# Patient Record
Sex: MALE | Race: WHITE
[De-identification: names, ages, dates, MRNs, and addresses within clinical notes are randomized per-mention and may not be internally consistent; named-entity substitution may affect disease eponyms.]

---

## 2018-01-05 ENCOUNTER — HOSPITAL ENCOUNTER (EMERGENCY)
Dept: HOSPITAL 76 - ED | Age: 48
Discharge: HOME | End: 2018-01-05
Payer: COMMERCIAL

## 2018-01-05 VITALS — DIASTOLIC BLOOD PRESSURE: 84 MMHG | SYSTOLIC BLOOD PRESSURE: 130 MMHG

## 2018-01-05 DIAGNOSIS — N12: Primary | ICD-10-CM

## 2018-01-05 DIAGNOSIS — Z87.440: ICD-10-CM

## 2018-01-05 LAB
CLARITY UR REFRACT.AUTO: (no result)
GLUCOSE UR QL STRIP.AUTO: NEGATIVE MG/DL
KETONES UR QL STRIP.AUTO: NEGATIVE MG/DL
NITRITE UR QL STRIP.AUTO: POSITIVE
PH UR STRIP.AUTO: 6 PH (ref 5–7.5)
PROT UR STRIP.AUTO-MCNC: NEGATIVE MG/DL
RBC # UR STRIP.AUTO: (no result) /UL
RBC # URNS HPF: (no result) /HPF (ref 0–5)
SP GR UR STRIP.AUTO: <=1.005 (ref 1–1.03)
SQUAMOUS URNS QL MICRO: (no result)
UROBILINOGEN UR QL STRIP.AUTO: (no result) E.U./DL
UROBILINOGEN UR STRIP.AUTO-MCNC: NEGATIVE MG/DL

## 2018-01-05 PROCEDURE — 81001 URINALYSIS AUTO W/SCOPE: CPT

## 2018-01-05 PROCEDURE — 99283 EMERGENCY DEPT VISIT LOW MDM: CPT

## 2018-01-05 PROCEDURE — 87181 SC STD AGAR DILUTION PER AGT: CPT

## 2018-01-05 PROCEDURE — 87086 URINE CULTURE/COLONY COUNT: CPT

## 2018-01-05 PROCEDURE — 81003 URINALYSIS AUTO W/O SCOPE: CPT

## 2018-01-05 NOTE — ED PHYSICIAN DOCUMENTATION
PD HPI MALE 





- Stated complaint


Stated Complaint: MALE 





- Chief complaint


Chief Complaint: UTI





- History obtained from


History obtained from: Patient





- History of Present Illness


Timing - onset: Other (He has a history of UTI 1 about 3 years ago.  He ran a 

marathon on [place] Year[place] and then started to feel little ill the next day, 

got worse over the next couple of days with fatigue and body aches, nothing 

focal.  It got better but then last night developed shaking chills and blood in 

the urine with frequency.  No flank pain.)





Review of Systems


Constitutional: reports: Fever, Chills, Myalgias, Fatigue


Ears: denies: Ear pain


Nose: denies: Rhinorrhea / runny nose, Congestion


Throat: denies: Sore throat


Respiratory: denies: Cough


GI: denies: Abdominal Pain, Nausea, Vomiting, Diarrhea





PD PAST MEDICAL HISTORY





- Past Medical History


Past Medical History: No





- Past Surgical History


Past Surgical History: No





- Present Medications


Home Medications: 


 Ambulatory Orders











 Medication  Instructions  Recorded  Confirmed


 


Ciprofloxacin HCl [Cipro] 500 mg PO BID #20 tablet 01/05/18 














- Allergies


Allergies/Adverse Reactions: 


 Allergies











Allergy/AdvReac Type Severity Reaction Status Date / Time


 


No Known Drug Allergies Allergy   Verified 01/05/18 09:07














- Social History


Does the pt smoke?: No


Smoking Status: Never smoker


Does the pt drink ETOH?: Yes


Does the pt have substance abuse?: No





- Immunizations


Immunizations are current?: Yes





- POLST


Patient has POLST: No





PD ED PE NORMAL





- Vitals


Vital signs reviewed: Yes





- General


General: Alert and oriented X 3, No acute distress





- HEENT


HEENT: Pharynx benign





- Cardiac


Cardiac: RRR, No murmur





- Respiratory


Respiratory: No respiratory distress, Clear bilaterally





- Abdomen


Abdomen: Non tender





- Rectal


Rectal: Other (Prostate nontender)





- Back


Back: No CVA TTP, No spinal TTP





- Extremities


Extremities: No edema, No calf tenderness / cord





- Neuro


Neuro: Alert and oriented X 3, Normal speech





Results





- Vitals


Vitals: 


 Vital Signs - 24 hr











  01/05/18





  09:08


 


Temperature 36.6 C


 


Heart Rate 70


 


Respiratory 16





Rate 


 


Blood Pressure 122/73


 


O2 Saturation 97








 Oxygen











O2 Source                      Room air

















- Labs


Labs: 


 Laboratory Tests











  01/05/18





  09:20


 


Urine Color  YELLOW


 


Urine Clarity  HAZY


 


Urine pH  6.0


 


Ur Specific Gravity  <=1.005


 


Urine Protein  NEGATIVE


 


Urine Glucose (UA)  NEGATIVE


 


Urine Ketones  NEGATIVE


 


Urine Occult Blood  MODERATE H


 


Urine Nitrite  POSITIVE H


 


Urine Bilirubin  NEGATIVE


 


Urine Urobilinogen  0.2 (NORMAL)


 


Ur Leukocyte Esterase  LARGE H


 


Urine RBC  0-5


 


Urine WBC  >25 H


 


Ur Squamous Epith Cells  RARE Squamous


 


Urine Bacteria  Many H


 


Ur Microscopic Review  INDICATED


 


Urine Culture Comments  INDICATED














Departure





- Departure


Disposition: 01 Home, Self Care


Clinical Impression: 


 Pyelonephritis





Condition: Good


Record reviewed to determine appropriate education?: Yes


Instructions:  ED UTI Pyelonephritis Male


Prescriptions: 


Ciprofloxacin HCl [Cipro] 500 mg PO BID #20 tablet


Comments: 


Call your doctor to arrange a follow-up appointment, make the next available 

appointment.  In the interim, return anytime if worse or if new symptoms 

develop.





We will culture your urine, the results should be done in 48-72 hours.  If an 

antibiotic change is necessary we will call you.  Return if worse in the 

meantime, especially if you develop increasing flank pain, fevers, or cannot 

keep down the medication.

## 2018-11-21 ENCOUNTER — HOSPITAL ENCOUNTER (EMERGENCY)
Dept: HOSPITAL 76 - ED | Age: 48
Discharge: HOME | End: 2018-11-21
Payer: COMMERCIAL

## 2018-11-21 VITALS — DIASTOLIC BLOOD PRESSURE: 67 MMHG | SYSTOLIC BLOOD PRESSURE: 137 MMHG

## 2018-11-21 DIAGNOSIS — J06.9: Primary | ICD-10-CM

## 2018-11-21 PROCEDURE — 99283 EMERGENCY DEPT VISIT LOW MDM: CPT

## 2018-11-21 NOTE — ED PHYSICIAN DOCUMENTATION
PD HPI URI





- Stated complaint


Stated Complaint: CHEST CONGESTION/COUGHING





- Chief complaint


Chief Complaint: Resp





- History obtained from


History obtained from: Patient





- History of Present Illness


Timing - onset: How many days ago (2-3)


Timing duration: Days (2-3)


Timing details: Gradual onset


Associated symptoms: Nasal congestion, Sore throat, Swollen nodes, Dry cough.  

No: Fever


Contributing factors: No: Sick contact [place] and the 

patient is concerned that his wife had recent vaginitis and patient had contact 

with her orally.)





Review of Systems


Constitutional: reports: Chills, Myalgias.  denies: Fever


Nose: reports: Rhinorrhea / runny nose, Congestion


Throat: reports: Sore throat


Respiratory: reports: Dyspnea, Cough





PD PAST MEDICAL HISTORY





- Past Medical History


Cardiovascular: None


Neuro: None


HEENT: Other (redness without exudate in back of throat. )





- Past Surgical History


Past Surgical History: No





- Present Medications


Home Medications: 


                                Ambulatory Orders











 Medication  Instructions  Recorded  Confirmed


 


Benzonatate [Tessalon Perle] 100 - 200 mg PO TID PRN #30 capsule 11/21/18 


 


Dexamethasone [Decadron] 4 mg PO DAILY #5 tablet 11/21/18 


 


Doxycycline Hyclate 100 mg PO BID #14 capsule 11/21/18 


 


guaiFENesin/CODEINE [Robitussin AC] 5 - 10 ml PO Q6H #120 ml 11/21/18 














- Allergies


Allergies/Adverse Reactions: 


                                    Allergies











Allergy/AdvReac Type Severity Reaction Status Date / Time


 


No Known Drug Allergies Allergy   Verified 11/21/18 11:39














- Social History


Does the pt smoke?: No


Smoking Status: Never smoker


Does the pt drink ETOH?: Yes


Does the pt have substance abuse?: No





- Immunizations


Immunizations are current?: Yes





- POLST


Patient has POLST: No





PD ED PE NORMAL





- Vitals


Vital signs reviewed: Yes





- General


General: Alert and oriented X 3, No acute distress, Well developed/nourished





- HEENT


HEENT: Ears normal, Moist mucous membranes, Pharynx benign, Dentition benign





- Neck


Neck: Supple, no meningeal sign, No bony TTP, No adenopathy





Results





- Vitals


Vitals: 


                               Vital Signs - 24 hr











  11/21/18





  11:37


 


Temperature 37.1 C


 


Heart Rate 63


 


Respiratory 18





Rate 


 


Blood Pressure 137/67 H


 


O2 Saturation 97








                                     Oxygen











O2 Source                      Room air

















PD MEDICAL DECISION MAKING





- ED course


Complexity details: considered differential, d/w patient





Departure





- Departure


Disposition: 01 Home, Self Care


Clinical Impression: 


Upper respiratory infection


Qualifiers:


 URI type: unspecified URI Qualified Code(s): J06.9 - Acute upper respiratory 

infection, unspecified





Condition: Stable


Record reviewed to determine appropriate education?: Yes


Instructions:  ED Upper Resp Infec Abx Tx


Follow-Up: 


LEON LEDEZMA [Primary Care Provider] - 


Prescriptions: 


Benzonatate [Tessalon Perle] 100 - 200 mg PO TID PRN #30 capsule


 PRN Reason: Cough


Dexamethasone [Decadron] 4 mg PO DAILY #5 tablet


Doxycycline Hyclate 100 mg PO BID #14 capsule


guaiFENesin/CODEINE [Robitussin AC] 5 - 10 ml PO Q6H #120 ml


Comments: 


Rest and drink lots of fluids.  Tessalon if needed for cough.  Decadron steroid 

for reducing inflammation of the bronchials.  Doxycycline antibiotic for the 

bronchial infection.  Add cough medicine if needed.  Tylenol or ibuprofen if 

needed for fevers and pains.  Recheck if not improving over the next several 

days to week.


Discharge Date/Time: 11/21/18 13:43

## 2022-03-27 ENCOUNTER — HOSPITAL ENCOUNTER (EMERGENCY)
Dept: HOSPITAL 76 - ED | Age: 52
Discharge: HOME | End: 2022-03-27
Payer: COMMERCIAL

## 2022-03-27 VITALS — DIASTOLIC BLOOD PRESSURE: 71 MMHG | SYSTOLIC BLOOD PRESSURE: 133 MMHG

## 2022-03-27 DIAGNOSIS — M54.50: Primary | ICD-10-CM

## 2022-03-27 PROCEDURE — 96372 THER/PROPH/DIAG INJ SC/IM: CPT

## 2022-03-27 PROCEDURE — 99282 EMERGENCY DEPT VISIT SF MDM: CPT

## 2022-03-27 PROCEDURE — 99283 EMERGENCY DEPT VISIT LOW MDM: CPT

## 2022-03-27 RX ADMIN — KETOROLAC TROMETHAMINE STA MG: 30 INJECTION, SOLUTION INTRAMUSCULAR at 19:15

## 2022-03-27 RX ADMIN — HYDROMORPHONE HYDROCHLORIDE STA MG: 1 INJECTION, SOLUTION INTRAMUSCULAR; INTRAVENOUS; SUBCUTANEOUS at 19:16

## 2022-03-27 RX ADMIN — HYDROMORPHONE HYDROCHLORIDE STA MG: 1 INJECTION, SOLUTION INTRAMUSCULAR; INTRAVENOUS; SUBCUTANEOUS at 20:06

## 2022-03-27 RX ADMIN — ONDANSETRON STA MG: 4 TABLET, ORALLY DISINTEGRATING ORAL at 19:15

## 2022-03-27 RX ADMIN — OXYCODONE AND ACETAMINOPHEN STA BOTTLE: 5; 325 TABLET ORAL at 21:02

## 2022-03-27 NOTE — ED PHYSICIAN DOCUMENTATION
PD HPI BACK PAIN





- Stated complaint


Stated Complaint: BACK PAIN





- Chief complaint


Chief Complaint: Back Pain





- History obtained from


History obtained from: Patient





- Additional information


Additional information: 





Relatively healthy 51-year-old gentleman was working at home around 1230 with 

his wife.  They were lowing a very large glass door and he felt a tweak in his 

low back and the sudden onset of pretty significant pain.  He was tolerating it 

okay for a while and was functional.  That said then he sat on the couch for a 

while and then could not get up.  There is no associated weakness, numbness, 

tingling, saddle anesthesia, fevers.  No history of recent infections or spinal 

issues other than one similar episode of back pain a few years ago.





Review of Systems


Constitutional: denies: Fever, Chills


Throat: reports: Reviewed and negative


Cardiac: reports: Reviewed and negative


Respiratory: reports: Reviewed and negative





PD PAST MEDICAL HISTORY





- Past Medical History


Cardiovascular: None


Neuro: None


HEENT: Other (redness without exudate in back of throat. )





- Past Surgical History


Past Surgical History: No





- Present Medications


Home Medications: 


                                Ambulatory Orders











 Medication  Instructions  Recorded  Confirmed


 


Ibuprofen [Motrin] 800 mg PO Q8H PRN #30 tablet 03/27/22 


 


Naproxen Sodium [Aleve]  03/27/22 


 


Oxycodone HCl/Acetaminophen 1 - 2 each PO Q6H PRN #14 tablet 03/27/22 





[Percocet 5-325 mg Tablet]   














- Allergies


Allergies/Adverse Reactions: 


                                    Allergies











Allergy/AdvReac Type Severity Reaction Status Date / Time


 


No Known Drug Allergies Allergy   Verified 11/21/18 11:39














- Social History


Does the pt smoke?: No


Smoking Status: Never smoker


Does the pt drink ETOH?: Yes


Does the pt have substance abuse?: No





- Immunizations


Immunizations are current?: Yes





- POLST


Patient has POLST: No





PD ED PE NORMAL





- Vitals


Vital signs reviewed: Yes





- General


General: Alert and oriented X 3, Other (He appears comfortable at rest but 

winces with any motion.)





- Neck


Neck: Supple, no meningeal sign, No bony TTP





- Cardiac


Cardiac: RRR, No murmur





- Respiratory


Respiratory: No respiratory distress, Clear bilaterally





- Abdomen


Abdomen: Normal bowel sounds, Soft, Non tender





- Back


Back: Other (No midline spinal tenderness, the patient has equal and normal 

Achilles and patellar reflexes bilaterally.  Normal sensation in all areas of 

the legs.  Patient denies saddle anesthesia.  Normal strength in flexion-

extension at the ankles, knees, and flexion of the hips.)





- Extremities


Extremities: No deformity, No edema, No calf tenderness / cord





Results





- Vitals


Vitals: 


                               Vital Signs - 24 hr











  03/27/22 03/27/22 03/27/22





  17:44 19:05 19:44


 


Temperature 36.5 C  


 


Heart Rate 59 L 56 L 68


 


Respiratory 18 16 16





Rate   


 


Blood Pressure 143/64 H 128/79 130/76


 


O2 Saturation 96 97 93








                                     Oxygen











O2 Source                      Room air

















PD MEDICAL DECISION MAKING





- ED course


ED course: 





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 

with no red flags.  His pain was severe with movement though and he was 

initially administered 2 mg of Dilaudid and 60 mg of Toradol IM.  He had pretty 

good relief with this but was still not quite functional in the sense that he 

could not get dressed or change position without quite a bit of pain.  This was 

followed by 1 more milligram of Dilaudid and 1 mg of Ativan IM.  After which she

was not pain-free but he was certainly quite functional and happy with the 

results.





Departure





- Departure


Disposition: 01 Home, Self Care


Clinical Impression: 


Back pain


Qualifiers:


 Back pain location: low back pain Chronicity: acute Back pain laterality: 

bilateral Sciatica presence: without sciatica Qualified Code(s): M54.50 - Low 

back pain, unspecified





Condition: Good


Record reviewed to determine appropriate education?: Yes


Instructions:  ED Low Back Pain Injury


Prescriptions: 


Ibuprofen [Motrin] 800 mg PO Q8H PRN #30 tablet


 PRN Reason: PAIN &/OR FEVER


Oxycodone HCl/Acetaminophen [Percocet 5-325 mg Tablet] 1 - 2 each PO Q6H PRN #14

tablet


 PRN Reason: pain


Comments: 


You are seen today for back spasm.  You received initially 2 mg of Dilaudid and 

60 mg of Toradol.  You did get better with this but still had a lot of pain and 

we gave you 1 mg of Dilaudid and 1 mg of Ativan IM.  After which you seemed 

quite comfortable.  I sent your prescriptions electronically to the [facility] 

[facility].  





Call your doctor to arrange a follow-up appointment, make the next available 

appointment.  In the interim, return anytime if worse or if new symptoms 

develop.





Do not drink or drive tonight.





I am prescribing a short course of narcotic pain medication for you. These are 

potentially dangerous and addictive medications that should be used carefully.


These medications may constipate you. Take an over-the-counter stool softener 

(docusate) twice daily with plenty of water while taking these medications. If 

you go 24 hours without a bowel movement, take over-the-counter miralax, per 

package instructions.


Do not drink or drive while taking these medications.


If you received narcotic or sedating medications while in the emergency 

department, do not drive for 24 hours.


Store this medication in a safe, secure place and out of reach of children.


It is a violation of federal law to give or sell this medication to another 

person or to use in a manner other than prescribed.


The ED will not refill narcotic prescriptions, including prescriptions lost or 

stolen.


To dispose of unwanted medications:


1. [facility] [address] [facility] [address] in 

[place] has a medication drop box. They accept prescription medications (in 

pill form) Monday through Friday 9:00 a.m. to 5:00 p.m.


2. The [facility] [employer] accepts prescription medications (in

pill form only) for disposal year round. Call [phone] for more 

information.


3. Contact the [facility] for the next [facility] sponsored prescription 

drug collection event. [phone], [phone], or [phone] 

[phone];


Note that many narcotic pain relievers also contain Tylenol/acetaminophen.  

Please ensure that your total dose of acetaminophen from all sources does not 

exceed 3 g (3000 mg) per day.

## 2023-05-23 ENCOUNTER — HOSPITAL ENCOUNTER (OUTPATIENT)
Dept: HOSPITAL 76 - LAB.N | Age: 53
Discharge: HOME | End: 2023-05-23
Attending: NURSE PRACTITIONER
Payer: COMMERCIAL

## 2023-05-23 DIAGNOSIS — Z00.00: Primary | ICD-10-CM

## 2023-05-23 DIAGNOSIS — Z13.220: ICD-10-CM

## 2023-05-23 DIAGNOSIS — Z12.5: ICD-10-CM

## 2023-05-23 LAB
ALBUMIN DIAFP-MCNC: 4.4 G/DL (ref 3.2–5.5)
ALBUMIN/GLOB SERPL: 1.5 {RATIO} (ref 1–2.2)
ALP SERPL-CCNC: 53 IU/L (ref 42–121)
ALT SERPL W P-5'-P-CCNC: 24 IU/L (ref 10–60)
ANION GAP SERPL CALCULATED.4IONS-SCNC: 3 MMOL/L (ref 6–13)
AST SERPL W P-5'-P-CCNC: 23 IU/L (ref 10–42)
BASOPHILS NFR BLD AUTO: 0 10^3/UL (ref 0–0.1)
BASOPHILS NFR BLD AUTO: 0.7 %
BILIRUB BLD-MCNC: 0.8 MG/DL (ref 0.2–1)
BUN SERPL-MCNC: 23 MG/DL (ref 6–20)
CALCIUM UR-MCNC: 9.1 MG/DL (ref 8.5–10.3)
CHLORIDE SERPL-SCNC: 111 MMOL/L (ref 101–111)
CHOLEST SERPL-MCNC: 227 MG/DL
CO2 SERPL-SCNC: 29 MMOL/L (ref 21–32)
CREAT SERPLBLD-SCNC: 1 MG/DL (ref 0.6–1.2)
EOSINOPHIL # BLD AUTO: 0.2 10^3/UL (ref 0–0.7)
EOSINOPHIL NFR BLD AUTO: 3.4 %
ERYTHROCYTE [DISTWIDTH] IN BLOOD BY AUTOMATED COUNT: 12.9 % (ref 12–15)
GFRSERPLBLD MDRD-ARVRAT: 79 ML/MIN/{1.73_M2} (ref 89–?)
GLOBULIN SER-MCNC: 3 G/DL (ref 2.1–4.2)
GLUCOSE SERPL-MCNC: 111 MG/DL (ref 70–100)
HCT VFR BLD AUTO: 47.5 % (ref 42–52)
HDLC SERPL-MCNC: 61 MG/DL
HDLC SERPL: 3.7 {RATIO} (ref ?–5)
HGB UR QL STRIP: 15.2 G/DL (ref 14–18)
LDLC SERPL CALC-MCNC: 151 MG/DL
LDLC/HDLC SERPL: 2.5 {RATIO} (ref ?–3.6)
LYMPHOCYTES # SPEC AUTO: 1.9 10^3/UL (ref 1.5–3.5)
LYMPHOCYTES NFR BLD AUTO: 34.1 %
MCH RBC QN AUTO: 29.9 PG (ref 27–31)
MCHC RBC AUTO-ENTMCNC: 32 G/DL (ref 32–36)
MCV RBC AUTO: 93.3 FL (ref 80–94)
MONOCYTES # BLD AUTO: 0.6 10^3/UL (ref 0–1)
MONOCYTES NFR BLD AUTO: 10.3 %
NEUTROPHILS # BLD AUTO: 2.8 10^3/UL (ref 1.5–6.6)
NEUTROPHILS # SNV AUTO: 5.5 X10^3/UL (ref 4.8–10.8)
NEUTROPHILS NFR BLD AUTO: 51.3 %
NRBC # BLD AUTO: 0 /100WBC
NRBC # BLD AUTO: 0 X10^3/UL
PDW BLD AUTO: 9.4 FL (ref 7.4–11.4)
PLATELET # BLD: 302 10^3/UL (ref 130–450)
POTASSIUM SERPL-SCNC: 4.6 MMOL/L (ref 3.5–5)
PROT SPEC-MCNC: 7.4 G/DL (ref 6.7–8.2)
RBC MAR: 5.09 10^6/UL (ref 4.7–6.1)
SODIUM SERPLBLD-SCNC: 143 MMOL/L (ref 135–145)
TRIGL P FAST SERPL-MCNC: 77 MG/DL
TSH SERPL-ACNC: 1.59 UIU/ML (ref 0.34–5.6)
VLDLC SERPL-SCNC: 15 MG/DL

## 2023-05-23 PROCEDURE — 83721 ASSAY OF BLOOD LIPOPROTEIN: CPT

## 2023-05-23 PROCEDURE — 84153 ASSAY OF PSA TOTAL: CPT

## 2023-05-23 PROCEDURE — 36415 COLL VENOUS BLD VENIPUNCTURE: CPT

## 2023-05-23 PROCEDURE — 80050 GENERAL HEALTH PANEL: CPT

## 2023-05-23 PROCEDURE — 80061 LIPID PANEL: CPT

## 2023-05-30 ENCOUNTER — HOSPITAL ENCOUNTER (OUTPATIENT)
Dept: HOSPITAL 76 - DI | Age: 53
Discharge: HOME | End: 2023-05-30
Attending: NURSE PRACTITIONER
Payer: COMMERCIAL

## 2023-05-30 DIAGNOSIS — K40.90: Primary | ICD-10-CM

## 2023-05-30 DIAGNOSIS — I86.1: ICD-10-CM

## 2023-05-31 NOTE — ULTRASOUND REPORT
PROCEDURE:  Testicle

 

INDICATIONS:  LEFT TESTICULAR PAIN

 

TECHNIQUE:  

Real-time scanning was performed of the scrotum and testicles, with image documentation.  Color and p
ulse Doppler interrogation was performed of both testicles.  

 

COMPARISON:  None.

 

FINDINGS:  

 

Right:  Testicle is normal in size at 4.5 x 2.2 x 2.8 cm, and homogenous in echotexture.  Epididymis 
is normal in overall size and morphology. Mild varicoceles.  Overlying scrotal skin is normal in thic
kness.  

 

Left:  Testicle is normal in size at 4.6 x 2.3 x 3 cm, and homogeneous in echotexture.  Epididymis is
 normal in overall size and morphology. Mild varicoceles.  Overlying scrotal skin is normal in thickn
ess.  

 

Doppler:  Color and pulse Doppler demonstrate normal and symmetric arterial flow in both testicles.  


 

There is a fat-containing, reducible left inguinal hernia. The neck measures 0.6 cm and the herniated
 portion measures 1 x 2.5 cm.

 

IMPRESSION:  

Left inguinal hernia containing fat.

 

Bilateral testicular varicoceles.

 

Reviewed by: Ferny Salgado on 5/31/2023 11:11 AM PDT

Approved by: Ferny Salgado on 5/31/2023 11:11 AM PDT

 

 

Station ID:  [id number]

## 2024-08-15 ENCOUNTER — HOSPITAL ENCOUNTER (OUTPATIENT)
Dept: HOSPITAL 76 - LAB.N | Age: 54
Discharge: HOME | End: 2024-08-15
Attending: NURSE PRACTITIONER
Payer: COMMERCIAL

## 2024-08-15 DIAGNOSIS — Z00.00: Primary | ICD-10-CM

## 2024-08-15 DIAGNOSIS — Z12.5: ICD-10-CM

## 2024-08-15 LAB
ALBUMIN DIAFP-MCNC: 4.4 G/DL (ref 3.2–5.5)
ALBUMIN/GLOB SERPL: 1.8 {RATIO} (ref 1–2.2)
ALP SERPL-CCNC: 67 IU/L (ref 42–121)
ALT SERPL W P-5'-P-CCNC: 18 IU/L (ref 10–60)
ANION GAP SERPL CALCULATED.4IONS-SCNC: 4 MMOL/L (ref 6–13)
AST SERPL W P-5'-P-CCNC: 18 IU/L (ref 10–42)
BASOPHILS NFR BLD AUTO: 0 10^3/UL (ref 0–0.1)
BASOPHILS NFR BLD AUTO: 0.7 %
BILIRUB BLD-MCNC: 1 MG/DL (ref 0.2–1)
BUN SERPL-MCNC: 22 MG/DL (ref 6–20)
CALCIUM UR-MCNC: 9.3 MG/DL (ref 8.5–10.3)
CHLORIDE SERPL-SCNC: 106 MMOL/L (ref 101–111)
CHOLEST SERPL-MCNC: 181 MG/DL
CO2 SERPL-SCNC: 30 MMOL/L (ref 21–32)
CREAT SERPLBLD-SCNC: 1.1 MG/DL (ref 0.6–1.3)
EOSINOPHIL # BLD AUTO: 0.1 10^3/UL (ref 0–0.7)
EOSINOPHIL NFR BLD AUTO: 2.2 %
ERYTHROCYTE [DISTWIDTH] IN BLOOD BY AUTOMATED COUNT: 12.6 % (ref 12–15)
GFRSERPLBLD MDRD-ARVRAT: 70 ML/MIN/{1.73_M2} (ref 89–?)
GLOBULIN SER-MCNC: 2.5 G/DL (ref 2.1–4.2)
GLUCOSE SERPL-MCNC: 84 MG/DL (ref 74–104)
HCT VFR BLD AUTO: 47 % (ref 42–52)
HDLC SERPL-MCNC: 53 MG/DL
HDLC SERPL: 3.4 {RATIO} (ref ?–5)
HGB UR QL STRIP: 15.1 G/DL (ref 14–18)
LDLC SERPL CALC-MCNC: 113 MG/DL
LDLC/HDLC SERPL: 2.1 {RATIO} (ref ?–3.6)
LYMPHOCYTES # SPEC AUTO: 1.8 10^3/UL (ref 1.5–3.5)
LYMPHOCYTES NFR BLD AUTO: 30.4 %
MCH RBC QN AUTO: 29.6 PG (ref 27–31)
MCHC RBC AUTO-ENTMCNC: 32.1 G/DL (ref 32–36)
MCV RBC AUTO: 92.2 FL (ref 80–94)
MONOCYTES # BLD AUTO: 0.7 10^3/UL (ref 0–1)
MONOCYTES NFR BLD AUTO: 11 %
NEUTROPHILS # BLD AUTO: 3.3 10^3/UL (ref 1.5–6.6)
NEUTROPHILS # SNV AUTO: 6 X10^3/UL (ref 4.8–10.8)
NEUTROPHILS NFR BLD AUTO: 55.4 %
NRBC # BLD AUTO: 0 /100WBC
NRBC # BLD AUTO: 0 X10^3/UL
PDW BLD AUTO: 9.3 FL (ref 7.4–11.4)
PLATELET # BLD: 304 10^3/UL (ref 130–450)
POTASSIUM SERPL-SCNC: 4.4 MMOL/L (ref 3.5–4.5)
PROT SPEC-MCNC: 6.9 G/DL (ref 6.4–8.9)
RBC MAR: 5.1 10^6/UL (ref 4.7–6.1)
SODIUM SERPLBLD-SCNC: 140 MMOL/L (ref 135–145)
TRIGL P FAST SERPL-MCNC: 76 MG/DL
TSH SERPL-ACNC: 1.21 UIU/ML (ref 0.34–5.6)
VLDLC SERPL-SCNC: 15 MG/DL

## 2024-08-15 PROCEDURE — 84153 ASSAY OF PSA TOTAL: CPT

## 2024-08-15 PROCEDURE — 80050 GENERAL HEALTH PANEL: CPT

## 2024-08-15 PROCEDURE — 80061 LIPID PANEL: CPT

## 2024-08-15 PROCEDURE — 36415 COLL VENOUS BLD VENIPUNCTURE: CPT

## 2024-08-15 PROCEDURE — 83721 ASSAY OF BLOOD LIPOPROTEIN: CPT

## 2024-08-16 ENCOUNTER — HOSPITAL ENCOUNTER (EMERGENCY)
Dept: HOSPITAL 76 - ED | Age: 54
Discharge: HOME | End: 2024-08-16
Payer: COMMERCIAL

## 2024-08-16 VITALS — SYSTOLIC BLOOD PRESSURE: 134 MMHG | DIASTOLIC BLOOD PRESSURE: 72 MMHG

## 2024-08-16 VITALS — OXYGEN SATURATION: 99 %

## 2024-08-16 DIAGNOSIS — S60.512A: Primary | ICD-10-CM
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
## 2024-08-16 DIAGNOSIS — S60.222A: ICD-10-CM
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
## 2024-08-16 PROCEDURE — 99283 EMERGENCY DEPT VISIT LOW MDM: CPT

## 2024-08-16 NOTE — XRAY REPORT
PROCEDURE:  Hand 3+V LT

 

INDICATIONS:  Trauma

 

TECHNIQUE:  3 views of the hand(s) acquired.  

 

COMPARISON:  None.

 

FINDINGS:  

 

Bones:  No fractures or dislocations.  No suspicious bony lesions.  

 

Soft tissues:  No suspicious soft tissue calcifications or masses.  

 

 

IMPRESSION:  

No acute bony abnormality.

 

 

 

Reviewed by: Gil Lang MD on 8/16/2024 1:24 PM PDT

Approved by: Gil Lang MD on 8/16/2024 1:24 PM PDT

 

 

Station ID:  [id number]

## 2024-08-16 NOTE — ED PHYSICIAN DOCUMENTATION
PD HPI LOWER EXT INJURY





- Stated complaint


Stated Complaint: LT ARM INJ





- Chief complaint


Chief Complaint: Trauma Ext





- History obtained from


History obtained from: Patient ([place] 

and crushed his left hand between the cart and the wall.  No other injuries.  He

is right-hand dominant and up-to-date on tetanus.)





PD PAST MEDICAL HISTORY





- Past Medical History


Past Medical History: No


Cardiovascular: None


Respiratory: None


Neuro: None


Endocrine/Autoimmune: None


GI: None


: None


HEENT: Other


Psych: None


Musculoskeletal: Chronic back pain


Derm: None





- Past Surgical History


Past Surgical History: No


General: Colonoscopy





- Present Medications


Home Medications: 


                                Ambulatory Orders











 Medication  Instructions  Recorded  Confirmed


 


Ibuprofen [Motrin] 800 mg PO Q8H PRN #30 tablet 03/27/22 


 


Naproxen Sodium [Aleve]  03/27/22 


 


Oxycodone HCl/Acetaminophen 1 - 2 each PO Q6H PRN #14 tablet 03/27/22 





[Percocet 5-325 mg Tablet]   


 


Ibuprofen [Motrin] 800 mg PO Q8H PRN #30 tablet 08/16/24 














- Allergies


Allergies/Adverse Reactions: 


                                    Allergies











Allergy/AdvReac Type Severity Reaction Status Date / Time


 


No Known Drug Allergies Allergy   Verified 08/16/24 12:40














- Social History


Does the pt smoke?: No


Smoking Status: Never smoker


Does the pt drink ETOH?: Yes


Does the pt have substance abuse?: No





- Immunizations


Immunizations are current?: Yes





- POLST


Patient has POLST: No





PD ED PE NORMAL





- Vitals


Vital signs reviewed: Yes





- General


General: Alert and oriented X 3, No acute distress





- Extremities


Extremities: Other (Swelling and contusion on the dorsum of the left hand with 

small abrasion.  Relatively good range of motion and no obvious bony 

tenderness.)





- Neuro


Neuro: Alert and oriented X 3, Normal speech





Results





- Vitals


Vitals: 





                               Vital Signs - 24 hr











  08/16/24





  12:40


 


Temperature 36.8 C


 


Heart Rate 60


 


Respiratory 18





Rate 


 


Blood Pressure 140/87 H


 


O2 Saturation 99








                                     Oxygen











O2 Source                      Room air

















- Rads (name of study)


  ** Three-view x-ray of left hand was negative


Relevant Findings:: Final report received, EMP independent interpretation of 

test





Departure





- Departure


Disposition: 01 Home, Self Care


Clinical Impression: 


 Contusion of left hand





Condition: Good


Record reviewed to determine appropriate education?: Yes


Instructions:  ED Contusion Hand


Prescriptions: 


Ibuprofen [Motrin] 800 mg PO Q8H PRN #30 tablet


 PRN Reason: PAIN &/OR FEVER


Comments: 


Recheck with your doctor in a week or 2 if not improving, return for new or 

worsening symptoms.